# Patient Record
Sex: FEMALE | Race: WHITE | NOT HISPANIC OR LATINO | Employment: PART TIME
[De-identification: names, ages, dates, MRNs, and addresses within clinical notes are randomized per-mention and may not be internally consistent; named-entity substitution may affect disease eponyms.]

---

## 2019-12-15 ENCOUNTER — HEALTH MAINTENANCE LETTER (OUTPATIENT)
Age: 34
End: 2019-12-15

## 2020-03-22 ENCOUNTER — HEALTH MAINTENANCE LETTER (OUTPATIENT)
Age: 35
End: 2020-03-22

## 2021-01-15 ENCOUNTER — HEALTH MAINTENANCE LETTER (OUTPATIENT)
Age: 36
End: 2021-01-15

## 2021-10-24 ENCOUNTER — HEALTH MAINTENANCE LETTER (OUTPATIENT)
Age: 36
End: 2021-10-24

## 2022-02-13 ENCOUNTER — HEALTH MAINTENANCE LETTER (OUTPATIENT)
Age: 37
End: 2022-02-13

## 2022-10-16 ENCOUNTER — HEALTH MAINTENANCE LETTER (OUTPATIENT)
Age: 37
End: 2022-10-16

## 2023-03-26 ENCOUNTER — HEALTH MAINTENANCE LETTER (OUTPATIENT)
Age: 38
End: 2023-03-26

## 2023-04-02 ENCOUNTER — HOSPITAL ENCOUNTER (OUTPATIENT)
Facility: CLINIC | Age: 38
Setting detail: OBSERVATION
Discharge: HOME | End: 2023-04-04
Attending: HOSPITALIST | Admitting: HOSPITALIST
Payer: COMMERCIAL

## 2023-04-02 DIAGNOSIS — I26.99 PULMONARY EMBOLISM, UNSPECIFIED CHRONICITY, UNSPECIFIED PULMONARY EMBOLISM TYPE, UNSPECIFIED WHETHER ACUTE COR PULMONALE PRESENT (H): ICD-10-CM

## 2023-04-02 DIAGNOSIS — K85.10 GALLSTONE PANCREATITIS: Primary | ICD-10-CM

## 2023-04-02 LAB
ALBUMIN SERPL BCG-MCNC: 4.6 G/DL (ref 3.5–5.2)
ALP SERPL-CCNC: 76 U/L (ref 35–104)
ALT SERPL W P-5'-P-CCNC: 281 U/L (ref 10–35)
ANION GAP SERPL CALCULATED.3IONS-SCNC: 11 MMOL/L (ref 7–15)
AST SERPL W P-5'-P-CCNC: 159 U/L (ref 10–35)
BASOPHILS # BLD MANUAL: 0 10E3/UL (ref 0–0.2)
BASOPHILS NFR BLD MANUAL: 0 %
BILIRUB SERPL-MCNC: 0.3 MG/DL
BUN SERPL-MCNC: 6.1 MG/DL (ref 6–20)
CALCIUM SERPL-MCNC: 9.3 MG/DL (ref 8.6–10)
CHLORIDE SERPL-SCNC: 107 MMOL/L (ref 98–107)
CREAT SERPL-MCNC: 0.6 MG/DL (ref 0.51–0.95)
DEPRECATED HCO3 PLAS-SCNC: 25 MMOL/L (ref 22–29)
EOSINOPHIL # BLD MANUAL: 0.1 10E3/UL (ref 0–0.7)
EOSINOPHIL NFR BLD MANUAL: 1 %
ERYTHROCYTE [DISTWIDTH] IN BLOOD BY AUTOMATED COUNT: 12.1 % (ref 10–15)
GFR SERPL CREATININE-BSD FRML MDRD: >90 ML/MIN/1.73M2
GLUCOSE SERPL-MCNC: 84 MG/DL (ref 70–99)
HCG INTACT+B SERPL-ACNC: <1 MIU/ML
HCT VFR BLD AUTO: 40.7 % (ref 35–47)
HGB BLD-MCNC: 13.8 G/DL (ref 11.7–15.7)
LIPASE SERPL-CCNC: 85 U/L (ref 13–60)
LYMPHOCYTES # BLD MANUAL: 1.4 10E3/UL (ref 0.8–5.3)
LYMPHOCYTES NFR BLD MANUAL: 23 %
MCH RBC QN AUTO: 30.6 PG (ref 26.5–33)
MCHC RBC AUTO-ENTMCNC: 33.9 G/DL (ref 31.5–36.5)
MCV RBC AUTO: 90 FL (ref 78–100)
MONOCYTES # BLD MANUAL: 0.5 10E3/UL (ref 0–1.3)
MONOCYTES NFR BLD MANUAL: 8 %
NEUTROPHILS # BLD MANUAL: 4.3 10E3/UL (ref 1.6–8.3)
NEUTROPHILS NFR BLD MANUAL: 68 %
PLAT MORPH BLD: ABNORMAL
PLATELET # BLD AUTO: 212 10E3/UL (ref 150–450)
POTASSIUM SERPL-SCNC: 4 MMOL/L (ref 3.4–5.3)
PROT SERPL-MCNC: 7.3 G/DL (ref 6.4–8.3)
RBC # BLD AUTO: 4.51 10E6/UL (ref 3.8–5.2)
RBC MORPH BLD: ABNORMAL
SODIUM SERPL-SCNC: 143 MMOL/L (ref 136–145)
VARIANT LYMPHS BLD QL SMEAR: PRESENT
WBC # BLD AUTO: 6.3 10E3/UL (ref 4–11)

## 2023-04-02 PROCEDURE — 85027 COMPLETE CBC AUTOMATED: CPT | Performed by: NURSE PRACTITIONER

## 2023-04-02 PROCEDURE — 99221 1ST HOSP IP/OBS SF/LOW 40: CPT | Mod: 57 | Performed by: SURGERY

## 2023-04-02 PROCEDURE — 36415 COLL VENOUS BLD VENIPUNCTURE: CPT | Performed by: NURSE PRACTITIONER

## 2023-04-02 PROCEDURE — 84702 CHORIONIC GONADOTROPIN TEST: CPT | Performed by: NURSE PRACTITIONER

## 2023-04-02 PROCEDURE — 99207 PR APP CREDIT; MD BILLING SHARED VISIT: CPT | Mod: FS | Performed by: INTERNAL MEDICINE

## 2023-04-02 PROCEDURE — 80053 COMPREHEN METABOLIC PANEL: CPT | Performed by: NURSE PRACTITIONER

## 2023-04-02 PROCEDURE — 258N000003 HC RX IP 258 OP 636: Performed by: NURSE PRACTITIONER

## 2023-04-02 PROCEDURE — 85007 BL SMEAR W/DIFF WBC COUNT: CPT | Performed by: NURSE PRACTITIONER

## 2023-04-02 PROCEDURE — 120N000001 HC R&B MED SURG/OB

## 2023-04-02 PROCEDURE — 99222 1ST HOSP IP/OBS MODERATE 55: CPT | Mod: AI | Performed by: NURSE PRACTITIONER

## 2023-04-02 PROCEDURE — 83690 ASSAY OF LIPASE: CPT | Performed by: NURSE PRACTITIONER

## 2023-04-02 RX ORDER — NALOXONE HYDROCHLORIDE 0.4 MG/ML
0.4 INJECTION, SOLUTION INTRAMUSCULAR; INTRAVENOUS; SUBCUTANEOUS
Status: DISCONTINUED | OUTPATIENT
Start: 2023-04-02 | End: 2023-04-04 | Stop reason: HOSPADM

## 2023-04-02 RX ORDER — POLYETHYLENE GLYCOL 3350 17 G/17G
17 POWDER, FOR SOLUTION ORAL DAILY PRN
Status: DISCONTINUED | OUTPATIENT
Start: 2023-04-02 | End: 2023-04-04 | Stop reason: HOSPADM

## 2023-04-02 RX ORDER — NALOXONE HYDROCHLORIDE 0.4 MG/ML
0.2 INJECTION, SOLUTION INTRAMUSCULAR; INTRAVENOUS; SUBCUTANEOUS
Status: DISCONTINUED | OUTPATIENT
Start: 2023-04-02 | End: 2023-04-04 | Stop reason: HOSPADM

## 2023-04-02 RX ORDER — ONDANSETRON 2 MG/ML
4 INJECTION INTRAMUSCULAR; INTRAVENOUS EVERY 6 HOURS PRN
Status: DISCONTINUED | OUTPATIENT
Start: 2023-04-02 | End: 2023-04-04 | Stop reason: HOSPADM

## 2023-04-02 RX ORDER — LIDOCAINE 40 MG/G
CREAM TOPICAL
Status: DISCONTINUED | OUTPATIENT
Start: 2023-04-02 | End: 2023-04-04 | Stop reason: HOSPADM

## 2023-04-02 RX ORDER — SODIUM CHLORIDE 9 MG/ML
INJECTION, SOLUTION INTRAVENOUS CONTINUOUS
Status: DISCONTINUED | OUTPATIENT
Start: 2023-04-02 | End: 2023-04-03

## 2023-04-02 RX ORDER — AMOXICILLIN 250 MG
1 CAPSULE ORAL 2 TIMES DAILY PRN
Status: DISCONTINUED | OUTPATIENT
Start: 2023-04-02 | End: 2023-04-04 | Stop reason: HOSPADM

## 2023-04-02 RX ORDER — ONDANSETRON 4 MG/1
4 TABLET, ORALLY DISINTEGRATING ORAL EVERY 6 HOURS PRN
Status: DISCONTINUED | OUTPATIENT
Start: 2023-04-02 | End: 2023-04-04 | Stop reason: HOSPADM

## 2023-04-02 RX ORDER — HYDROMORPHONE HYDROCHLORIDE 1 MG/ML
0.3 INJECTION, SOLUTION INTRAMUSCULAR; INTRAVENOUS; SUBCUTANEOUS
Status: DISCONTINUED | OUTPATIENT
Start: 2023-04-02 | End: 2023-04-03

## 2023-04-02 RX ORDER — BISACODYL 10 MG
10 SUPPOSITORY, RECTAL RECTAL DAILY PRN
Status: DISCONTINUED | OUTPATIENT
Start: 2023-04-02 | End: 2023-04-04 | Stop reason: HOSPADM

## 2023-04-02 RX ADMIN — SODIUM CHLORIDE: 9 INJECTION, SOLUTION INTRAVENOUS at 23:20

## 2023-04-02 RX ADMIN — SODIUM CHLORIDE: 9 INJECTION, SOLUTION INTRAVENOUS at 13:31

## 2023-04-02 ASSESSMENT — ACTIVITIES OF DAILY LIVING (ADL)
ADLS_ACUITY_SCORE: 37

## 2023-04-02 NOTE — PROGRESS NOTES
Reason for admission: Acute cholecystitis, possible gallstone pancreatitis  Surgical Procedure/s: Plan cholecystectomy with Intra-Op cholangiogram tomorrow.  Mental Status:x4  Activity: Independent  Diet: Regular, NPO after midnight  Pain: controlled and tolerable.  Urination: Continent b/b. Uses the bathroom  Tele/Restraints/Iso: NSR  LDA: PIV infusing w/ NS at 100 ml/hr  Expected D/C Date: TBD  Other Info: Per hospitalist, will hold off on antibiotics No fever or elevated WBCs. GI and General Surgery ff.

## 2023-04-02 NOTE — PHARMACY-ADMISSION MEDICATION HISTORY
Pharmacy Medication History  Admission medication history interview status for the 4/2/2023  admission is complete. See EPIC admission navigator for prior to admission medications     Location of Interview: Patient room  Medication history sources: Patient, Surescripts and Care Everywhere    Significant changes made to the medication list:  No changes made to the list    In the past week, patient estimated taking medication this percent of the time: greater than 90%      Additional medication history information:   Patient stated that she takes no prescription or over the counter medications.     Medication reconciliation completed by provider prior to medication history? Yes    Time spent in this activity: 10 minutes    Prior to Admission medications    Not on File       The information provided in this note is only as accurate as the sources available at the time of update(s)

## 2023-04-02 NOTE — CONSULTS
37-year-old female with acute biliary colic and gallbladder wall thickening elevated LFTs and minimally elevated lipase ultrasound findings consistent with normal CBD no other obvious significant pancreas findings.  Agree with the current plan n.p.o. further evaluation in general surgery for possible cholecystitis.  Repeat labs again tomorrow.  Cholecystectomy with Intra-Op cholangiogram.  IV fluid, IV Protonix.  IV pain control.    Julio Cesar Colon MD FACP

## 2023-04-02 NOTE — H&P
[facility]    History and Physical - Hospitalist Service       Date of Admission:  4/2/2023    Assessment & Plan      Jaimee Strauss is a 37 year old female admitted on 4/2/2023 with past medical history of PE and mild intermittent asthma who presents to [facility] with complaint of upper right quadrant pain. Patient transferred to [facility] for higher level of care with general surgeon.       Labs at [facility]  white count is normal, alk phos and bili are normal.  ,  and lipase 633  Gallbladder ultrasound: cholelithasis with borderline gallbladder thickening, which could be related to cholecystitis       Labs:  Lipase 85          Acute gallstone pancreatitis   Mild Cholecystitis   -consult GI   -consult general surgery   -NPO   -IVF  -IV Protonix   -pain control   -antiemetic as needed  -will hold off on antibiotics No fever or elevated WBCs     History of PE 2015  -related to OCP's she was told and hospitalized 4 days back in January of 2015    history mild intermittent asthma   -no active issue  -patient denies taking any medications        Diet: NPO for Medical/Clinical Reasons Except for: No Exceptions    DVT Prophylaxis: Pneumatic Compression Devices and ambulate   Salomon Catheter: Not present  Lines: None     Cardiac Monitoring: ACTIVE order. Indication: gallstone pancreatisis  Code Status: Full Code      Clinically Significant Risk Factors Present on Admission                               Disposition Plan      Expected Discharge Date: 04/04/2023                The patient's care was discussed with the Attending Physician, Dr. Eubanks .    Deepa Pierce, CNP  Hospitalist Service  [facility]  Securely message with [employer] (more info)  Text page via [employer] Paging/Directory     ______________________________________________________________________    Chief Complaint   Right upper quadrant pain     History is obtained  from the patient, electronic health record and patient's significant other    History of Present Illness   Jaimee Strauss is a 37 year old female who with past medical history of PE and mild intermittent asthma  who presents to [facility] with complaint of upper right quadrant pain. Patient transferred to [facility] for higher level of care with general surgeon. Patient had been experiencing epigastric and right upper quadrant pain for 3 days. She reports right mid back pain association with this. She was unable to sleep last night due to the discomfort. She states she feel like her abdomen is swollen and pressure in the inside. She also reports few days ago sh had left sided ovary pain which is unusual for her. She denies vomiting or diarrhea but have had some nausea. She has had prior abdomen surgeries before including 3  and hysterectomy. She does have her gallbladder.         Past Medical History    Past Medical History:   Diagnosis Date     Asthma      NO ACTIVE PROBLEMS      PE (pulmonary embolism) 13    on coumadin       Past Surgical History   Past Surgical History:   Procedure Laterality Date      SECTION      X2     NO HISTORY OF SURGERY         Prior to Admission Medications   None        Physical Exam   Vital Signs: Temp: 98  F (36.7  C) Temp src: Oral BP: 119/76 Pulse: 84   Resp: 16 SpO2: 100 % O2 Device: None (Room air)    Weight: 0 lbs 0 oz   Constitutional: no distress, cooperative, alert, oriented, normal mood  Cardiovascular: Regular rate and rhythm, no murmurs  Respiratory: clear bilaterally, no crackles, wheezing or rales  Gastrointestinal: Abdomen soft, no significant distention, tender to palpation of RUQ . Abdomen is not rigid and she is not guarding   Skin: warm, dry, intact, no edema    Medical Decision Making       70 MINUTES SPENT BY ME on the date of service doing chart review, history, exam, documentation & further activities per the note.  MANAGEMENT  DISCUSSED with the following over the past 24 hours: patient, patient , and Dr. Eubanks        Data     I have personally reviewed the following data over the past 24 hrs:    6.3  \   13.8   / 212     143 107 6.1 /  84   4.0 25 0.60 \       ALT: 281 (H) AST: 159 (H) AP: 76 TBILI: 0.3   ALB: 4.6 TOT PROTEIN: 7.3 LIPASE: 85 (H)       Imaging results reviewed over the past 24 hrs:   No results found for this or any previous visit (from the past 24 hour(s)).

## 2023-04-02 NOTE — CONSULTS
Surgery Consultation, [facility]         Kalpesh Lou MD, MD    Jaimee Strauss MRN# [id number]   YOB: 1985 Age: 37 year old     PCP:  Chantell Thompson [phone]    Chief Complaint:  Right upper quadrant pain, nausea    History of Present Illness:  Jaimee Strauss is a 37 year old female who presented with several episodes of upper abdominal pain and intermittent nausea, usually after eating.  Commonly associated with eating greasy foods.  Her first episode occurred approximately 3 days ago when she was on vacation in [place].  Pain lasted a couple of hours but improved.  Most recent episode occurred last night when she was awoken from sleep.  Pain was very severe, substernal, radiating toward the right.  Patient had some nausea but no emesis.  She presented to an outside clinic where she was found to have gallstones with some mild elevation in her liver enzymes and an elevation in her lipase.  She was transferred for definitive care.  Currently feels significantly better.      PMH:  Jaimee Strauss  has a past medical history of Asthma, NO ACTIVE PROBLEMS, and PE (pulmonary embolism) (13).  PSH:  Jaimee Strauss  has a past surgical history that includes no history of surgery and  section.    Home medications and allergies reviewed.    Social History:  Jaimee Strauss  reports that she has never smoked. She has never used smokeless tobacco. She reports that she does not drink alcohol and does not use drugs.  Family History:  Jaimee Strauss family history includes Cancer in her maternal grandfather; Cardiovascular in her maternal grandmother; Hypertension in her maternal grandmother.    ROS:  The 10 point Review of Systems is negative other than noted in the HPI.  No fever or chills.  Does admit to a significant weight loss, intentional, over the past few months.    Physical Exam:  Blood pressure 119/76, pulse 84, temperature 98  F (36.7   C), temperature source Oral, resp. rate 16, SpO2 100 %, not currently breastfeeding.  0 lbs 0 oz  Healthy appearing young female in no distress.  Patient has a pleasant affect, speaks without difficulty.   Pupils equal round and reactive to light.   No cervical lymphadenopathy or thyromegaly.   Lung fields clear, breathing comfortably.   Heart normal sinus rhythm.  No murmurs rubs or gallops.  Abdomen soft, nontender, nondistended.  Minimal tenderness in the right upper quadrant, no masses appreciated. No peritoneal signs or rebound.  Admits to significantly less pain with palpation than 6 hours ago  Skin warm, dry, without rashes or lesions.    All new lab and imaging data was reviewed.  Abdominal ultrasound shows gallbladder with questionable thickening and gallstones     Assessment/plan:  Jaimee Strauss is a 37 year old female with signs and symptoms suggesting acute cholecystitis, possible gallstone pancreatitis. I've recommended laparoscopic cholecystectomy with Intra-Op cholangiogram. Surgical comorbidities include history of pulmonary embolism.  I feel the patient is a good candidate for the surgery and that this should be done during this hospitalization.  If the cholangiogram reveals choledocholithiasis, ERCP would likely be necessary.  We will check her labs tomorrow morning and get her scheduled for surgery.  Okay to eat tonight.    Jude Lou M.D.  [facility]  [phone]    Please route or send letter to:  Primary Care Provider (PCP) and Referring Provider

## 2023-04-03 ENCOUNTER — ANESTHESIA EVENT (OUTPATIENT)
Dept: SURGERY | Facility: CLINIC | Age: 38
End: 2023-04-03
Payer: COMMERCIAL

## 2023-04-03 ENCOUNTER — APPOINTMENT (OUTPATIENT)
Dept: GENERAL RADIOLOGY | Facility: CLINIC | Age: 38
End: 2023-04-03
Attending: HOSPITALIST
Payer: COMMERCIAL

## 2023-04-03 ENCOUNTER — ANESTHESIA (OUTPATIENT)
Dept: SURGERY | Facility: CLINIC | Age: 38
End: 2023-04-03
Payer: COMMERCIAL

## 2023-04-03 LAB
ALBUMIN SERPL BCG-MCNC: 4.1 G/DL (ref 3.5–5.2)
ALP SERPL-CCNC: 62 U/L (ref 35–104)
ALT SERPL W P-5'-P-CCNC: 182 U/L (ref 10–35)
ANION GAP SERPL CALCULATED.3IONS-SCNC: 8 MMOL/L (ref 7–15)
AST SERPL W P-5'-P-CCNC: 61 U/L (ref 10–35)
BILIRUB DIRECT SERPL-MCNC: <0.2 MG/DL (ref 0–0.3)
BILIRUB SERPL-MCNC: 0.4 MG/DL
BUN SERPL-MCNC: 5.8 MG/DL (ref 6–20)
CALCIUM SERPL-MCNC: 8.7 MG/DL (ref 8.6–10)
CHLORIDE SERPL-SCNC: 109 MMOL/L (ref 98–107)
CREAT SERPL-MCNC: 0.59 MG/DL (ref 0.51–0.95)
DEPRECATED HCO3 PLAS-SCNC: 24 MMOL/L (ref 22–29)
ERYTHROCYTE [DISTWIDTH] IN BLOOD BY AUTOMATED COUNT: 12.2 % (ref 10–15)
GFR SERPL CREATININE-BSD FRML MDRD: >90 ML/MIN/1.73M2
GLUCOSE SERPL-MCNC: 87 MG/DL (ref 70–99)
HCT VFR BLD AUTO: 39.1 % (ref 35–47)
HGB BLD-MCNC: 12.9 G/DL (ref 11.7–15.7)
MCH RBC QN AUTO: 30.4 PG (ref 26.5–33)
MCHC RBC AUTO-ENTMCNC: 33 G/DL (ref 31.5–36.5)
MCV RBC AUTO: 92 FL (ref 78–100)
PLATELET # BLD AUTO: 192 10E3/UL (ref 150–450)
POTASSIUM SERPL-SCNC: 4.2 MMOL/L (ref 3.4–5.3)
PROT SERPL-MCNC: 6.5 G/DL (ref 6.4–8.3)
RBC # BLD AUTO: 4.25 10E6/UL (ref 3.8–5.2)
SODIUM SERPL-SCNC: 141 MMOL/L (ref 136–145)
WBC # BLD AUTO: 5.3 10E3/UL (ref 4–11)

## 2023-04-03 PROCEDURE — 250N000025 HC SEVOFLURANE, PER MIN: Performed by: SURGERY

## 2023-04-03 PROCEDURE — 258N000003 HC RX IP 258 OP 636: Performed by: ANESTHESIOLOGY

## 2023-04-03 PROCEDURE — 82040 ASSAY OF SERUM ALBUMIN: CPT | Performed by: NURSE PRACTITIONER

## 2023-04-03 PROCEDURE — 999N000141 HC STATISTIC PRE-PROCEDURE NURSING ASSESSMENT: Performed by: SURGERY

## 2023-04-03 PROCEDURE — 258N000003 HC RX IP 258 OP 636: Performed by: NURSE ANESTHETIST, CERTIFIED REGISTERED

## 2023-04-03 PROCEDURE — 250N000009 HC RX 250: Performed by: NURSE ANESTHETIST, CERTIFIED REGISTERED

## 2023-04-03 PROCEDURE — 96375 TX/PRO/DX INJ NEW DRUG ADDON: CPT

## 2023-04-03 PROCEDURE — 85027 COMPLETE CBC AUTOMATED: CPT | Performed by: NURSE PRACTITIONER

## 2023-04-03 PROCEDURE — 272N000001 HC OR GENERAL SUPPLY STERILE: Performed by: SURGERY

## 2023-04-03 PROCEDURE — 370N000017 HC ANESTHESIA TECHNICAL FEE, PER MIN: Performed by: SURGERY

## 2023-04-03 PROCEDURE — 250N000011 HC RX IP 250 OP 636: Performed by: SURGERY

## 2023-04-03 PROCEDURE — 96374 THER/PROPH/DIAG INJ IV PUSH: CPT | Mod: 59

## 2023-04-03 PROCEDURE — 999N000179 XR SURGERY CARM FLUORO LESS THAN 5 MIN W STILLS

## 2023-04-03 PROCEDURE — 250N000011 HC RX IP 250 OP 636: Performed by: ANESTHESIOLOGY

## 2023-04-03 PROCEDURE — 88304 TISSUE EXAM BY PATHOLOGIST: CPT | Mod: TC | Performed by: SURGERY

## 2023-04-03 PROCEDURE — 47563 LAPARO CHOLECYSTECTOMY/GRAPH: CPT | Performed by: SURGERY

## 2023-04-03 PROCEDURE — 360N000077 HC SURGERY LEVEL 4, PER MIN: Performed by: SURGERY

## 2023-04-03 PROCEDURE — 250N000009 HC RX 250: Performed by: SURGERY

## 2023-04-03 PROCEDURE — 250N000011 HC RX IP 250 OP 636: Performed by: STUDENT IN AN ORGANIZED HEALTH CARE EDUCATION/TRAINING PROGRAM

## 2023-04-03 PROCEDURE — 710N000009 HC RECOVERY PHASE 1, LEVEL 1, PER MIN: Performed by: SURGERY

## 2023-04-03 PROCEDURE — 82310 ASSAY OF CALCIUM: CPT | Performed by: NURSE PRACTITIONER

## 2023-04-03 PROCEDURE — 36415 COLL VENOUS BLD VENIPUNCTURE: CPT | Performed by: NURSE PRACTITIONER

## 2023-04-03 PROCEDURE — 99221 1ST HOSP IP/OBS SF/LOW 40: CPT | Mod: 57 | Performed by: SURGERY

## 2023-04-03 PROCEDURE — 96376 TX/PRO/DX INJ SAME DRUG ADON: CPT

## 2023-04-03 PROCEDURE — 250N000011 HC RX IP 250 OP 636: Performed by: NURSE ANESTHETIST, CERTIFIED REGISTERED

## 2023-04-03 PROCEDURE — 250N000009 HC RX 250: Performed by: ANESTHESIOLOGY

## 2023-04-03 PROCEDURE — 88304 TISSUE EXAM BY PATHOLOGIST: CPT | Mod: 26

## 2023-04-03 PROCEDURE — 250N000013 HC RX MED GY IP 250 OP 250 PS 637: Performed by: SURGERY

## 2023-04-03 PROCEDURE — 82374 ASSAY BLOOD CARBON DIOXIDE: CPT | Performed by: NURSE PRACTITIONER

## 2023-04-03 PROCEDURE — 258N000003 HC RX IP 258 OP 636: Performed by: SURGERY

## 2023-04-03 PROCEDURE — G0378 HOSPITAL OBSERVATION PER HR: HCPCS

## 2023-04-03 RX ORDER — SODIUM CHLORIDE, SODIUM LACTATE, POTASSIUM CHLORIDE, CALCIUM CHLORIDE 600; 310; 30; 20 MG/100ML; MG/100ML; MG/100ML; MG/100ML
INJECTION, SOLUTION INTRAVENOUS CONTINUOUS
Status: DISCONTINUED | OUTPATIENT
Start: 2023-04-03 | End: 2023-04-03 | Stop reason: HOSPADM

## 2023-04-03 RX ORDER — NEOSTIGMINE METHYLSULFATE 1 MG/ML
VIAL (ML) INJECTION PRN
Status: DISCONTINUED | OUTPATIENT
Start: 2023-04-03 | End: 2023-04-03

## 2023-04-03 RX ORDER — LIDOCAINE 40 MG/G
CREAM TOPICAL
Status: DISCONTINUED | OUTPATIENT
Start: 2023-04-03 | End: 2023-04-03 | Stop reason: HOSPADM

## 2023-04-03 RX ORDER — ONDANSETRON 2 MG/ML
4 INJECTION INTRAMUSCULAR; INTRAVENOUS EVERY 30 MIN PRN
Status: DISCONTINUED | OUTPATIENT
Start: 2023-04-03 | End: 2023-04-03 | Stop reason: HOSPADM

## 2023-04-03 RX ORDER — ACETAMINOPHEN 325 MG/1
650 TABLET ORAL EVERY 4 HOURS PRN
Status: DISCONTINUED | OUTPATIENT
Start: 2023-04-06 | End: 2023-04-04 | Stop reason: HOSPADM

## 2023-04-03 RX ORDER — AMOXICILLIN 250 MG
1 CAPSULE ORAL 2 TIMES DAILY
Status: DISCONTINUED | OUTPATIENT
Start: 2023-04-03 | End: 2023-04-04 | Stop reason: HOSPADM

## 2023-04-03 RX ORDER — POLYETHYLENE GLYCOL 3350 17 G/17G
17 POWDER, FOR SOLUTION ORAL DAILY
Status: DISCONTINUED | OUTPATIENT
Start: 2023-04-04 | End: 2023-04-04 | Stop reason: HOSPADM

## 2023-04-03 RX ORDER — CEFAZOLIN SODIUM/WATER 2 G/20 ML
2 SYRINGE (ML) INTRAVENOUS SEE ADMIN INSTRUCTIONS
Status: DISCONTINUED | OUTPATIENT
Start: 2023-04-03 | End: 2023-04-03 | Stop reason: HOSPADM

## 2023-04-03 RX ORDER — CEFAZOLIN SODIUM/WATER 2 G/20 ML
2 SYRINGE (ML) INTRAVENOUS
Status: COMPLETED | OUTPATIENT
Start: 2023-04-03 | End: 2023-04-03

## 2023-04-03 RX ORDER — HEPARIN SODIUM 5000 [USP'U]/.5ML
5000 INJECTION, SOLUTION INTRAVENOUS; SUBCUTANEOUS EVERY 8 HOURS SCHEDULED
Status: DISCONTINUED | OUTPATIENT
Start: 2023-04-04 | End: 2023-04-04 | Stop reason: HOSPADM

## 2023-04-03 RX ORDER — ACETAMINOPHEN 325 MG/1
975 TABLET ORAL EVERY 8 HOURS SCHEDULED
Status: DISCONTINUED | OUTPATIENT
Start: 2023-04-03 | End: 2023-04-04 | Stop reason: HOSPADM

## 2023-04-03 RX ORDER — ENOXAPARIN SODIUM 100 MG/ML
40 INJECTION SUBCUTANEOUS
Status: COMPLETED | OUTPATIENT
Start: 2023-04-03 | End: 2023-04-03

## 2023-04-03 RX ORDER — ONDANSETRON 4 MG/1
4 TABLET, ORALLY DISINTEGRATING ORAL EVERY 30 MIN PRN
Status: DISCONTINUED | OUTPATIENT
Start: 2023-04-03 | End: 2023-04-03 | Stop reason: HOSPADM

## 2023-04-03 RX ORDER — FENTANYL CITRATE 50 UG/ML
50 INJECTION, SOLUTION INTRAMUSCULAR; INTRAVENOUS EVERY 5 MIN PRN
Status: DISCONTINUED | OUTPATIENT
Start: 2023-04-03 | End: 2023-04-03 | Stop reason: HOSPADM

## 2023-04-03 RX ORDER — ONDANSETRON 2 MG/ML
4 INJECTION INTRAMUSCULAR; INTRAVENOUS EVERY 6 HOURS PRN
Status: DISCONTINUED | OUTPATIENT
Start: 2023-04-03 | End: 2023-04-03

## 2023-04-03 RX ORDER — SCOLOPAMINE TRANSDERMAL SYSTEM 1 MG/1
1 PATCH, EXTENDED RELEASE TRANSDERMAL ONCE
Status: COMPLETED | OUTPATIENT
Start: 2023-04-03 | End: 2023-04-04

## 2023-04-03 RX ORDER — LIDOCAINE HYDROCHLORIDE 20 MG/ML
INJECTION, SOLUTION INFILTRATION; PERINEURAL PRN
Status: DISCONTINUED | OUTPATIENT
Start: 2023-04-03 | End: 2023-04-03

## 2023-04-03 RX ORDER — OXYCODONE HYDROCHLORIDE 5 MG/1
5 TABLET ORAL EVERY 4 HOURS PRN
Status: DISCONTINUED | OUTPATIENT
Start: 2023-04-03 | End: 2023-04-04 | Stop reason: HOSPADM

## 2023-04-03 RX ORDER — HEPARIN SODIUM 5000 [USP'U]/.5ML
5000 INJECTION, SOLUTION INTRAVENOUS; SUBCUTANEOUS EVERY 12 HOURS
Status: DISCONTINUED | OUTPATIENT
Start: 2023-04-03 | End: 2023-04-03

## 2023-04-03 RX ORDER — DEXAMETHASONE SODIUM PHOSPHATE 4 MG/ML
INJECTION, SOLUTION INTRA-ARTICULAR; INTRALESIONAL; INTRAMUSCULAR; INTRAVENOUS; SOFT TISSUE PRN
Status: DISCONTINUED | OUTPATIENT
Start: 2023-04-03 | End: 2023-04-03

## 2023-04-03 RX ORDER — HYDROMORPHONE HCL IN WATER/PF 6 MG/30 ML
0.2 PATIENT CONTROLLED ANALGESIA SYRINGE INTRAVENOUS
Status: DISCONTINUED | OUTPATIENT
Start: 2023-04-03 | End: 2023-04-04 | Stop reason: HOSPADM

## 2023-04-03 RX ORDER — FENTANYL CITRATE 50 UG/ML
INJECTION, SOLUTION INTRAMUSCULAR; INTRAVENOUS PRN
Status: DISCONTINUED | OUTPATIENT
Start: 2023-04-03 | End: 2023-04-03

## 2023-04-03 RX ORDER — OXYCODONE HYDROCHLORIDE 5 MG/1
10 TABLET ORAL EVERY 4 HOURS PRN
Status: DISCONTINUED | OUTPATIENT
Start: 2023-04-03 | End: 2023-04-04 | Stop reason: HOSPADM

## 2023-04-03 RX ORDER — BUPIVACAINE HYDROCHLORIDE AND EPINEPHRINE 2.5; 5 MG/ML; UG/ML
INJECTION, SOLUTION INFILTRATION; PERINEURAL PRN
Status: DISCONTINUED | OUTPATIENT
Start: 2023-04-03 | End: 2023-04-03 | Stop reason: HOSPADM

## 2023-04-03 RX ORDER — LIDOCAINE 40 MG/G
CREAM TOPICAL
Status: DISCONTINUED | OUTPATIENT
Start: 2023-04-03 | End: 2023-04-03

## 2023-04-03 RX ORDER — GLYCOPYRROLATE 0.2 MG/ML
INJECTION, SOLUTION INTRAMUSCULAR; INTRAVENOUS PRN
Status: DISCONTINUED | OUTPATIENT
Start: 2023-04-03 | End: 2023-04-03

## 2023-04-03 RX ORDER — DIPHENHYDRAMINE HYDROCHLORIDE 50 MG/ML
25 INJECTION INTRAMUSCULAR; INTRAVENOUS EVERY 6 HOURS PRN
Status: DISCONTINUED | OUTPATIENT
Start: 2023-04-03 | End: 2023-04-04 | Stop reason: HOSPADM

## 2023-04-03 RX ORDER — ONDANSETRON 4 MG/1
4 TABLET, ORALLY DISINTEGRATING ORAL EVERY 6 HOURS PRN
Status: DISCONTINUED | OUTPATIENT
Start: 2023-04-03 | End: 2023-04-03

## 2023-04-03 RX ORDER — ONDANSETRON 2 MG/ML
INJECTION INTRAMUSCULAR; INTRAVENOUS PRN
Status: DISCONTINUED | OUTPATIENT
Start: 2023-04-03 | End: 2023-04-03

## 2023-04-03 RX ORDER — HYDROMORPHONE HCL IN WATER/PF 6 MG/30 ML
0.4 PATIENT CONTROLLED ANALGESIA SYRINGE INTRAVENOUS
Status: DISCONTINUED | OUTPATIENT
Start: 2023-04-03 | End: 2023-04-04 | Stop reason: HOSPADM

## 2023-04-03 RX ORDER — FENTANYL CITRATE 50 UG/ML
25 INJECTION, SOLUTION INTRAMUSCULAR; INTRAVENOUS EVERY 5 MIN PRN
Status: DISCONTINUED | OUTPATIENT
Start: 2023-04-03 | End: 2023-04-03 | Stop reason: HOSPADM

## 2023-04-03 RX ORDER — BISACODYL 10 MG
10 SUPPOSITORY, RECTAL RECTAL DAILY PRN
Status: DISCONTINUED | OUTPATIENT
Start: 2023-04-03 | End: 2023-04-04 | Stop reason: HOSPADM

## 2023-04-03 RX ORDER — PROPOFOL 10 MG/ML
INJECTION, EMULSION INTRAVENOUS PRN
Status: DISCONTINUED | OUTPATIENT
Start: 2023-04-03 | End: 2023-04-03

## 2023-04-03 RX ORDER — MAGNESIUM HYDROXIDE 1200 MG/15ML
LIQUID ORAL PRN
Status: DISCONTINUED | OUTPATIENT
Start: 2023-04-03 | End: 2023-04-03 | Stop reason: HOSPADM

## 2023-04-03 RX ORDER — HYDROMORPHONE HCL IN WATER/PF 6 MG/30 ML
0.2 PATIENT CONTROLLED ANALGESIA SYRINGE INTRAVENOUS EVERY 5 MIN PRN
Status: DISCONTINUED | OUTPATIENT
Start: 2023-04-03 | End: 2023-04-03 | Stop reason: HOSPADM

## 2023-04-03 RX ORDER — DIPHENHYDRAMINE HCL 25 MG
25 CAPSULE ORAL EVERY 6 HOURS PRN
Status: DISCONTINUED | OUTPATIENT
Start: 2023-04-03 | End: 2023-04-04 | Stop reason: HOSPADM

## 2023-04-03 RX ORDER — PROCHLORPERAZINE MALEATE 10 MG
10 TABLET ORAL EVERY 6 HOURS PRN
Status: DISCONTINUED | OUTPATIENT
Start: 2023-04-03 | End: 2023-04-04 | Stop reason: HOSPADM

## 2023-04-03 RX ORDER — DEXTROSE MONOHYDRATE, SODIUM CHLORIDE, AND POTASSIUM CHLORIDE 50; 1.49; 4.5 G/1000ML; G/1000ML; G/1000ML
INJECTION, SOLUTION INTRAVENOUS CONTINUOUS
Status: DISCONTINUED | OUTPATIENT
Start: 2023-04-03 | End: 2023-04-04 | Stop reason: HOSPADM

## 2023-04-03 RX ORDER — PROPOFOL 10 MG/ML
INJECTION, EMULSION INTRAVENOUS CONTINUOUS PRN
Status: DISCONTINUED | OUTPATIENT
Start: 2023-04-03 | End: 2023-04-03

## 2023-04-03 RX ORDER — IOPAMIDOL 612 MG/ML
INJECTION, SOLUTION INTRATHECAL PRN
Status: DISCONTINUED | OUTPATIENT
Start: 2023-04-03 | End: 2023-04-03 | Stop reason: HOSPADM

## 2023-04-03 RX ORDER — HYDROMORPHONE HCL IN WATER/PF 6 MG/30 ML
0.4 PATIENT CONTROLLED ANALGESIA SYRINGE INTRAVENOUS EVERY 5 MIN PRN
Status: DISCONTINUED | OUTPATIENT
Start: 2023-04-03 | End: 2023-04-03 | Stop reason: HOSPADM

## 2023-04-03 RX ADMIN — HEPARIN SODIUM 5000 UNITS: 5000 INJECTION, SOLUTION INTRAVENOUS; SUBCUTANEOUS at 09:29

## 2023-04-03 RX ADMIN — FENTANYL CITRATE 25 MCG: 50 INJECTION, SOLUTION INTRAMUSCULAR; INTRAVENOUS at 11:32

## 2023-04-03 RX ADMIN — PHENYLEPHRINE HYDROCHLORIDE 100 MCG: 10 INJECTION INTRAVENOUS at 10:07

## 2023-04-03 RX ADMIN — HYDROMORPHONE HYDROCHLORIDE 0.2 MG: 0.2 INJECTION, SOLUTION INTRAMUSCULAR; INTRAVENOUS; SUBCUTANEOUS at 18:21

## 2023-04-03 RX ADMIN — HYDROMORPHONE HYDROCHLORIDE 0.3 MG: 1 INJECTION, SOLUTION INTRAMUSCULAR; INTRAVENOUS; SUBCUTANEOUS at 13:19

## 2023-04-03 RX ADMIN — NEOSTIGMINE METHYLSULFATE 4 MG: 1 INJECTION, SOLUTION INTRAVENOUS at 10:55

## 2023-04-03 RX ADMIN — PHENYLEPHRINE HYDROCHLORIDE 100 MCG: 10 INJECTION INTRAVENOUS at 10:12

## 2023-04-03 RX ADMIN — HYDROMORPHONE HYDROCHLORIDE 0.5 MG: 1 INJECTION, SOLUTION INTRAMUSCULAR; INTRAVENOUS; SUBCUTANEOUS at 10:25

## 2023-04-03 RX ADMIN — SENNOSIDES AND DOCUSATE SODIUM 1 TABLET: 50; 8.6 TABLET ORAL at 20:47

## 2023-04-03 RX ADMIN — GLYCOPYRROLATE 0.6 MG: 0.2 INJECTION, SOLUTION INTRAMUSCULAR; INTRAVENOUS at 10:55

## 2023-04-03 RX ADMIN — LIDOCAINE HYDROCHLORIDE 80 MG: 20 INJECTION, SOLUTION INFILTRATION; PERINEURAL at 09:55

## 2023-04-03 RX ADMIN — Medication 2 G: at 09:46

## 2023-04-03 RX ADMIN — ROCURONIUM BROMIDE 30 MG: 50 INJECTION, SOLUTION INTRAVENOUS at 10:03

## 2023-04-03 RX ADMIN — PROPOFOL 75 MCG/KG/MIN: 10 INJECTION, EMULSION INTRAVENOUS at 09:57

## 2023-04-03 RX ADMIN — HYDROMORPHONE HYDROCHLORIDE 0.2 MG: 0.2 INJECTION, SOLUTION INTRAMUSCULAR; INTRAVENOUS; SUBCUTANEOUS at 12:00

## 2023-04-03 RX ADMIN — ACETAMINOPHEN 975 MG: 325 TABLET ORAL at 22:08

## 2023-04-03 RX ADMIN — ONDANSETRON 4 MG: 2 INJECTION INTRAMUSCULAR; INTRAVENOUS at 13:23

## 2023-04-03 RX ADMIN — HYDROMORPHONE HYDROCHLORIDE 0.2 MG: 0.2 INJECTION, SOLUTION INTRAMUSCULAR; INTRAVENOUS; SUBCUTANEOUS at 12:27

## 2023-04-03 RX ADMIN — DEXAMETHASONE SODIUM PHOSPHATE 4 MG: 4 INJECTION, SOLUTION INTRA-ARTICULAR; INTRALESIONAL; INTRAMUSCULAR; INTRAVENOUS; SOFT TISSUE at 10:03

## 2023-04-03 RX ADMIN — POTASSIUM CHLORIDE, DEXTROSE MONOHYDRATE AND SODIUM CHLORIDE: 150; 5; 450 INJECTION, SOLUTION INTRAVENOUS at 16:35

## 2023-04-03 RX ADMIN — FENTANYL CITRATE 25 MCG: 50 INJECTION, SOLUTION INTRAMUSCULAR; INTRAVENOUS at 11:37

## 2023-04-03 RX ADMIN — SUCCINYLCHOLINE CHLORIDE 160 MG: 20 INJECTION, SOLUTION INTRAMUSCULAR; INTRAVENOUS; PARENTERAL at 09:55

## 2023-04-03 RX ADMIN — OXYCODONE HYDROCHLORIDE 5 MG: 5 TABLET ORAL at 22:25

## 2023-04-03 RX ADMIN — OXYCODONE HYDROCHLORIDE 10 MG: 5 TABLET ORAL at 16:32

## 2023-04-03 RX ADMIN — SCOPALAMINE 1 PATCH: 1 PATCH, EXTENDED RELEASE TRANSDERMAL at 09:39

## 2023-04-03 RX ADMIN — ONDANSETRON 4 MG: 2 INJECTION INTRAMUSCULAR; INTRAVENOUS at 10:45

## 2023-04-03 RX ADMIN — FENTANYL CITRATE 100 MCG: 50 INJECTION, SOLUTION INTRAMUSCULAR; INTRAVENOUS at 09:55

## 2023-04-03 RX ADMIN — PHENYLEPHRINE HYDROCHLORIDE 100 MCG: 10 INJECTION INTRAVENOUS at 10:52

## 2023-04-03 RX ADMIN — ACETAMINOPHEN 975 MG: 325 TABLET ORAL at 16:32

## 2023-04-03 RX ADMIN — MIDAZOLAM 2 MG: 1 INJECTION INTRAMUSCULAR; INTRAVENOUS at 09:46

## 2023-04-03 RX ADMIN — PROPOFOL 200 MG: 10 INJECTION, EMULSION INTRAVENOUS at 09:55

## 2023-04-03 RX ADMIN — SODIUM CHLORIDE, POTASSIUM CHLORIDE, SODIUM LACTATE AND CALCIUM CHLORIDE: 600; 310; 30; 20 INJECTION, SOLUTION INTRAVENOUS at 10:52

## 2023-04-03 RX ADMIN — SODIUM CHLORIDE, POTASSIUM CHLORIDE, SODIUM LACTATE AND CALCIUM CHLORIDE: 600; 310; 30; 20 INJECTION, SOLUTION INTRAVENOUS at 09:09

## 2023-04-03 ASSESSMENT — ACTIVITIES OF DAILY LIVING (ADL)
ADLS_ACUITY_SCORE: 37
ADLS_ACUITY_SCORE: 38
ADLS_ACUITY_SCORE: 37
ADLS_ACUITY_SCORE: 38
ADLS_ACUITY_SCORE: 37
ADLS_ACUITY_SCORE: 38
ADLS_ACUITY_SCORE: 37

## 2023-04-03 NOTE — OP NOTE
Surgeon: Rajeev Lawton MD  1st Assistant: Daysi Knox MD.  The assistant was medically necessary for their expertise in camera management, suctioning, suturing, and retraction.  PREOPERATIVE DIAGNOSIS: acute cholecystitis.   POSTOPERATIVE DIAGNOSES: Same  PROCEDURE:   1.  Laparoscopic cholecystectomy.   2.  cholangiogam  ANESTHESIA: General.   ESTIMATED BLOOD LOSS: Less than 5 mL.   OPERATIVE PROCEDURE: After induction of general endotracheal anesthesia, [facility] abdomen was prepped and draped in the usual sterile fashion. With the Anna technique in an periumbilical location, the abdomen was entered and pneumoperitoneum was established. Three additional 5 mm trocars were placed along the right hypochondrium. The gallbladder fundus was retracted cephalad and lateral and the infundibulum was retracted caudad and lateral. The peritoneum investing the triangle of Calot was incised with electrocautery and dissected bluntly.  The critical view of a single cystic duct, single cystic artery was achieved.   The cystic duct was clipped proximally and cystic ductotomy done distally, cholangiocatheter advanced and secured in place.  Cholangiogram showed normal cystic duct, hepatic ducts and common bile duct.  The catheter was removed and the duct decompressed.  We then doubly clipped the cystic duct distally and transect it sharply, the artery was doubly clipped on the patient's side, singly clipped on the gallbladder side and transected sharply.  The gallbladder was detached from the liver with electrocautery and blunt dissection. The specimen was removed from the patient's abdomen and sent to pathology. The gallbladder fossa was inspected. Hemostasis was secured with clips, and no evidence of bile leakage noted. The abdomen was surveyed and no other pathology seen. The trocars were then removed under direct visualization without evidence of bleeding. Periumbilical port was closed with multiple interrupted 0  Vicryl suture. Skin was approximated with absorbable sutures. Steri-Strips and sterile dressing applied. No immediate complications.   RUPERT KRAUS MD

## 2023-04-03 NOTE — PLAN OF CARE
Pt arrived to floor from PACU around 1245. Drowsy but A&Ox4. IV Dilaudid given once for pain. IV Zofran given once for nausea. Lap sites x4 CDI. Ice applied. Reports stiffness to shoulders/ upper back/ pain when taking deep breaths. IS & up to edge of bed encouraged. Hot packs/ warm blanket applied to shoulders. Tolerating clears. Spouse at bedside. Stable on RA. Tele NSR. Voided in BR.

## 2023-04-03 NOTE — ANESTHESIA POSTPROCEDURE EVALUATION
Patient: Jaimee Strauss    Procedure: Procedure(s):  CHOLECYSTECTOMY, LAPAROSCOPIC, WITH CHOLANGIOGRAM       Anesthesia Type:  General    Note:  Disposition: Inpatient   Postop Pain Control: Uneventful            Sign Out: Well controlled pain   PONV: No   Neuro/Psych: Uneventful            Sign Out: Acceptable/Baseline neuro status   Airway/Respiratory: Uneventful            Sign Out: Acceptable/Baseline resp. status   CV/Hemodynamics: Uneventful            Sign Out: Acceptable CV status; No obvious hypovolemia; No obvious fluid overload   Other NRE: NONE   DID A NON-ROUTINE EVENT OCCUR?            Last vitals:  Vitals Value Taken Time   /73 04/03/23 1240   Temp 37  C (98.6  F) 04/03/23 1240   Pulse 80 04/03/23 1249   Resp 81 04/03/23 1249   SpO2 99 % 04/03/23 1249   Vitals shown include unvalidated device data.    Electronically Signed By: Vern Oreilly DO, DO  April 3, 2023  3:17 PM

## 2023-04-03 NOTE — UTILIZATION REVIEW
"    Admission Status; Secondary Review Determination     Under the authority of the Utilization Management Committee, the utilization review process indicated a secondary review on the above patient. The review outcome is based on review of the medical records, discussions with staff, and applying clinical experience noted on the date of the review.     (x) Observation Status Appropriate - This patient does not meet hospital inpatient criteria and is placed in observation status. If this patient's primary payer is Medicare and was admitted as an inpatient, Condition Code 44 should be used and patient status changed to \"observation\".     RATIONALE FOR DETERMINATION:    37 year old female admitted on 4/2/2023 with past medical history of PE and mild intermittent asthma who presents to [facility] with complaint of upper right quadrant pain. Patient transferred to [facility] for higher level of care with general surgeon.     VS stable.  No fever    Labs notable for lipase of 85, , .  WBC count normal.  Lipase was elevated at 633 on 4/2/23    Patient was seen by general surgery and underwent laparoscopic cholecystectomy today    Patient has been started on a diet and is being advanced as tolerated.  Discharge anticipated within the next 24 hours      The severity of illness, intensity of service provided, expected LOS and risk for adverse outcome make the care appropriate for further observation; however, doesn't meet criteria for hospital inpatient admission. LANDEN Narcisa Radhanakia notified of this determination via web based page today  This document was produced using voice recognition software.   The information on this document is developed by the utilization review team in order for the business office to ensure compliance. This only denotes the appropriateness of proper admission status and does not reflect the quality of care rendered.   The definitions of Inpatient Status and Observation Status used " in making the determination above are those provided in the CMS Coverage Manual, Chapter 1 and Chapter 6, section 70.4.     Sincerely,     Quintin Tsai MD  Utilization Review   Physician Advisor   [facility]

## 2023-04-03 NOTE — PROGRESS NOTES
General surgery    Met with patient and marco aancé, reviewed surgical medical history.  PE, , hysterectomy.  Discussed the rationale for laparoscopic cholecystectomy with intraoperative cholangiogram.  The risk, benefits, hopeful outcomes, possible complications were explained in detail.  She understands and would like to proceed.  Subcu heparin and antibiotics given for prophylaxis.  We will consult our medicine colleagues after surgery with regards to postoperative blood clot prevention.    Total encounter time 30 minutes, more than half spent in counseling, review of data, and coordination of care.

## 2023-04-03 NOTE — DISCHARGE INSTRUCTIONS
[facility]  Discharge Instructions: Post-Operative Laparoscopic Cholecystectomy    ACTIVITY  Expect to feel tired after your surgery.  This will gradually resolve.    Take frequent, short walks and increase your activity gradually.    Avoid strenuous physical activity or heavy lifting greater than 15-20 lbs. for 2-3 weeks.  You may climb stairs.  You may drive without restrictions when you are not using any prescription pain medication and feel comfortable in a car.  You may return to work/school when you are comfortable without any prescription pain medication.    WOUND CARE  You may remove your outer dressing or Band-Aids and shower 48 hours after the surgery.  Pat your incisions dry and leave them open to air.  Re-apply dressing (Band-Aids or gauze/tape) as needed for comfort or drainage.  You may have steri-strips (looks like white tape) on your incision.  You may peel off the steri-strips 2 weeks after your surgery if they have not peeled off on their own.   Do not soak your incisions in a tub or pool for 2 weeks.   Do not apply any lotions, creams, or ointments to your incisions.  A ridge under your incisions is normal and will gradually resolve.    DIET  Start with liquids, then gradually resume your regular diet as tolerated.  Avoid heavy, spicy, and greasy meals for 2-3 days.  Drink plenty of fluids to stay hydrated.  It is not uncommon to experience some loose stools or diarrhea after surgery.  This is your body's way of adapting to the bile which will slowly drain into your intestine.  A low fat diet may help with this.  This should improve over 1-2 months.    PAIN  Expect some tenderness and discomfort at the incision sites.  Use the prescribed pain medication at your discretion.  Expect gradual resolution of your pain over several days.  You may take acetaminophen/Tylenol instead of or in addition to your prescribed pain medication.   Do not drink alcohol or drive while you  are taking pain medications.  You may apply ice to your incisions in 20 minute intervals as needed for the next 48 hours.  After that time, consider switching to heat if you prefer.    EXPECTATIONS  Pain medications can cause constipation.  Limit use when possible.  Take an over the counter or prescribed stool softener/stimulant, such as Colace or Senna, 1-2 times a day with plenty of water.  You may take a mild over the counter laxative, such as Miralax or a suppository, as needed.  You may discontinue these medications once you are having regular bowel movements and/or are no longer taking your narcotic pain medication.    You may have shoulder or upper back discomfort due to the gas used in surgery.  This is temporary and should resolve in 48-72 hours.  Short, frequent walks may help with this.  If you are unable to urinate for 8 hours or feel as though you are not emptying your bladder adequately, we recommend you seek care at an ER or Urgent Care facility for possible catheter placement.     FOLLOW UP  Our office will contact you in approximately 2-3 weeks to check on your progress and answer any questions you may have.  If you are doing well, you will not need to return for a follow up appointment.  If any concerns are identified over the phone, we will help you make an appointment to see a provider.   If you have not received a phone call, have any questions or concerns, or would like to be seen, please call us at [phone] and ask to speak with our nurse.  We are located at [address] [place] [address].    CALL OUR OFFICE -[phone] IF YOU HAVE:   Chills or fever above 101 F.  Increased redness, warmth, or drainage at your incisions.  Significant bleeding.  Pain not relieved by your pain medication or rest.  Increasing pain after the first 48 hours.  Any other concerns or questions.                      Revised December 2021

## 2023-04-03 NOTE — ANESTHESIA PROCEDURE NOTES
Airway       Patient location during procedure: OR       Procedure Start/Stop Times: 4/3/2023 9:57 AM  Staff -        Anesthesiologist:  Vern Oreilly DO       CRNA: Latonia Jay APRN CRNA       Performed By: CRNA  Consent for Airway        Urgency: elective  Indications and Patient Condition       Indications for airway management: mi-procedural       Induction type:RSI       Mask difficulty assessment: 0 - not attempted    Final Airway Details       Final airway type: endotracheal airway       Successful airway: ETT - single  Endotracheal Airway Details        ETT size (mm): 7.0       Cuffed: yes       Successful intubation technique: direct laryngoscopy       DL Blade Type: Short 2       Grade View of Cords: 1       Adjucts: stylet       Position: Right       Measured from: gums/teeth       Secured at (cm): 22       Bite block used: None    Post intubation assessment        Placement verified by: capnometry, equal breath sounds and chest rise        Number of attempts at approach: 1       Number of other approaches attempted: 0       Secured with: silk tape       Ease of procedure: easy       Dentition: Unchanged    Medication(s) Administered   Medication Administration Time: 4/3/2023 9:57 AM

## 2023-04-03 NOTE — ANESTHESIA PREPROCEDURE EVALUATION
Anesthesia Pre-Procedure Evaluation    Patient: Jaimee Strauss   MRN: [id number] : 1985        Procedure : Procedure(s):  CHOLECYSTECTOMY, LAPAROSCOPIC, WITH CHOLANGIOGRAM          Past Medical History:   Diagnosis Date     Asthma      NO ACTIVE PROBLEMS      PE (pulmonary embolism) 13    on coumadin      Past Surgical History:   Procedure Laterality Date      SECTION      X2     NO HISTORY OF SURGERY        No Known Allergies   Social History     Tobacco Use     Smoking status: Never     Smokeless tobacco: Never   Vaping Use     Vaping status: Not on file   Substance Use Topics     Alcohol use: No      Wt Readings from Last 1 Encounters:   16 70.3 kg (155 lb)        Anesthesia Evaluation            ROS/MED HX  ENT/Pulmonary:     (+) Intermittent, asthma Treatment: Inhaler prn,      Neurologic:  - neg neurologic ROS     Cardiovascular:  - neg cardiovascular ROS     METS/Exercise Tolerance:     Hematologic: Comments: PE -      (+) History of blood clots, pt is not anticoagulated,     Musculoskeletal:  - neg musculoskeletal ROS     GI/Hepatic:    (-) GERD   Renal/Genitourinary:  - neg Renal ROS     Endo:  - neg endo ROS     Psychiatric/Substance Use:  - neg psychiatric ROS     Infectious Disease:  - neg infectious disease ROS     Malignancy:       Other:            Physical Exam    Airway        Mallampati: II   TM distance: > 3 FB   Neck ROM: full   Mouth opening: > 3 cm    Respiratory Devices and Support         Dental       (+) Minor Abnormalities - some fillings, tiny chips      Cardiovascular   cardiovascular exam normal          Pulmonary   pulmonary exam normal                OUTSIDE LABS:  CBC:   Lab Results   Component Value Date    WBC 5.3 2023    WBC 6.3 2023    HGB 12.9 2023    HGB 13.8 2023    HCT 39.1 2023    HCT 40.7 2023     2023     2023     BMP:   Lab Results   Component Value Date     2023      04/02/2023    POTASSIUM 4.2 04/03/2023    POTASSIUM 4.0 04/02/2023    CHLORIDE 109 (H) 04/03/2023    CHLORIDE 107 04/02/2023    CO2 24 04/03/2023    CO2 25 04/02/2023    BUN 5.8 (L) 04/03/2023    BUN 6.1 04/02/2023    CR 0.59 04/03/2023    CR 0.60 04/02/2023    GLC 87 04/03/2023    GLC 84 04/02/2023     COAGS:   Lab Results   Component Value Date    PTT 46 (H) 02/25/2013    INR 2.4 04/29/2013     POC:   Lab Results   Component Value Date    HCGS Positive (A) 04/06/2013     HEPATIC:   Lab Results   Component Value Date    ALBUMIN 4.6 04/02/2023    PROTTOTAL 7.3 04/02/2023     (H) 04/02/2023     (H) 04/02/2023    ALKPHOS 76 04/02/2023    BILITOTAL 0.3 04/02/2023     OTHER:   Lab Results   Component Value Date    LIN 8.7 04/03/2023    LIPASE 85 (H) 04/02/2023    TSH 2.24 07/28/2016       Anesthesia Plan    ASA Status:  2   NPO Status:  NPO Appropriate    Anesthesia Type: General.     - Airway: ETT   Induction: Intravenous, RSI, Propofol.   Maintenance: Balanced.        Consents    Anesthesia Plan(s) and associated risks, benefits, and realistic alternatives discussed. Questions answered and patient/representative(s) expressed understanding.    - Discussed:     - Discussed with:  Patient         Postoperative Care    Pain management: IV analgesics.   PONV prophylaxis: Ondansetron (or other 5HT-3), Background Propofol Infusion, Dexamethasone or Solumedrol     Comments:                Vern Oreilly, DO, DO

## 2023-04-03 NOTE — INTERVAL H&P NOTE
I have reviewed the surgical (or preoperative) H&P that is linked to this encounter, and examined the patient. There are no significant changes    Clinical Conditions Present on Arrival:  Clinically Significant Risk Factors Present on Admission

## 2023-04-03 NOTE — PLAN OF CARE
Goal Outcome Evaluation:    Acute cholecystitis, possibly gallstone pancreatitis, laparoscopic cholecystectomy with Intra-Op cholangiogram today    No acute events. A&Ox4, able to let needs be known. Reports intermittent nausea, but none during assessments. Bowel sounds active, pt reports passing flatus. NPO since midnight. NSR noted on the strip, VSS. NS infusing at 100ml/hr. Ambulating independently in room. Call light within reach.     Writer assumed care of pt from [id number].  Chiquita Patel RN on 4/3/2023 at 4:42 AM

## 2023-04-03 NOTE — CONSULTS
[facility]  Gastroenterology Consultation         Jaimee Strauss  [address] [address]  37 year old female    Admission Date/Time: 2023  Primary Care Provider: Chantell Thompson  Referring / Attending Physician:  Deepa Pierce CNP    We were asked to see the patient in consultation by Deepa Pierce CNP for evaluation of acute gallstone pancreatitis.      CC: abdominal pain    HPI:  Jaimee Strauss is a 37 year old female who has a PMHx of PE and mild intermittent asthma whom presents with complaint of upper right quadrant paint to [facility]. Patient c/o epigastric and right upper quadrant pain for 3 days and radiated to right mid back pain. She denies vomiting or diarrhea but have had some nausea. She has had prior abdomen surgeries before including 3  and hysterectomy.     Her workup included labs; revealed elevated LFTs ,  and lipase 633. Gallbladder ultrasound: cholelithasis with borderline gallbladder thickening, with no biliary dilation. Since transfered to [facility] lipase near normal at 85.    ROS: A comprehensive ten point review of systems was negative aside from those in mentioned in the HPI.      PAST MED HX:  I have reviewed this patient's medical history and updated it with pertinent information if needed.   Past Medical History:   Diagnosis Date     Asthma      NO ACTIVE PROBLEMS      PE (pulmonary embolism) 15    on coumadin       MEDICATIONS:   None       ALLERGIES: No Known Allergies    SOCIAL HISTORY:  Social History     Tobacco Use     Smoking status: Never     Smokeless tobacco: Never   Substance Use Topics     Alcohol use: No     Drug use: No       FAMILY HISTORY:  Family History   Problem Relation Age of Onset     Cardiovascular Maternal Grandmother      Hypertension Maternal Grandmother      Cancer Maternal Grandfather        PHYSICAL EXAM:   General  Alert, oriented and comfortable  Vital Signs with  Ranges  Temp: 97.1  F (36.2  C) Temp src: Temporal BP: 122/82 Pulse: 85   Resp: 16 SpO2: 100 % O2 Device: None (Room air)    I/O last 3 completed shifts:  In: 1611 [P.O.:640; I.V.:971]  Out: -     Constitutional: healthy, alert and no distress   Cardiovascular: negative, PMI normal. No lifts, heaves, or thrills. RRR. No murmurs, clicks gallops or rub  Respiratory: negative, Percussion normal. Good diaphragmatic excursion. Lungs clear  Abdomen: Abdomen soft, tender- RUQ. BS normal. No masses, organomegaly          ADDITIONAL COMMENTS:   I reviewed the patient's new clinical lab test results.   Recent Labs   Lab Test 04/03/23  0745 04/02/23  1222   WBC 5.3 6.3   HGB 12.9 13.8   MCV 92 90    212     Recent Labs   Lab Test 04/03/23  0745 04/02/23  1222   POTASSIUM 4.2 4.0   CHLORIDE 109* 107   CO2 24 25   BUN 5.8* 6.1   ANIONGAP 8 11     Recent Labs   Lab Test 04/02/23  1222   ALBUMIN 4.6   BILITOTAL 0.3   *   *   LIPASE 85*       I reviewed the patient's new imaging results.        CONSULTATION ASSESSMENT AND PLAN:    Jaimee Strauss is a 37 year old female with signs and symptoms suggesting acute cholecystitis, possible gallstone pancreatitis    Cholelithiasis with acute cholecystitis and possible gallstone pancreatitis  Abdominal pain  Elevated LFTs  LFTs remain moderately elevated  Lipase near normal  RUQ sonogram notes findings consistent with cholecystitis and no biliary dilation  Appreciate surgical consult and plans for laparoscopic cholecystectomy today (4/3) with intraoperative cholangiogram  If has evidence of choledocholithiasis on IOC- will plan ERCP tomorrow (4/4/)    -- Daily labs  -- Please contact GI if ERCP is necessary tomorrow based on intraoperative cholangiogram finding        [facility].  Office: [phone]  Cell : [phone] (Dr. Colon)  Cell: [phone] (Chayo Torres PA-C)

## 2023-04-03 NOTE — ANESTHESIA CARE TRANSFER NOTE
Patient: Jaimee Strauss    Procedure: Procedure(s):  CHOLECYSTECTOMY, LAPAROSCOPIC, WITH CHOLANGIOGRAM       Diagnosis: Gallstone pancreatitis [K85.10]  Diagnosis Additional Information: No value filed.    Anesthesia Type:   General     Note:    Oropharynx: oropharynx clear of all foreign objects  Level of Consciousness: awake  Oxygen Supplementation: face mask  Level of Supplemental Oxygen (L/min / FiO2): 6  Independent Airway: airway patency satisfactory and stable  Dentition: dentition unchanged  Vital Signs Stable: post-procedure vital signs reviewed and stable  Report to RN Given: handoff report given  Patient transferred to: PACU    Handoff Report: Identifed the Patient, Identified the Reponsible Provider, Reviewed the pertinent medical history, Discussed the surgical course, Reviewed Intra-OP anesthesia mangement and issues during anesthesia, Set expectations for post-procedure period and Allowed opportunity for questions and acknowledgement of understanding      Vitals:  Vitals Value Taken Time   /97 04/03/23 1118   Temp     Pulse 79 04/03/23 1123   Resp 19 04/03/23 1123   SpO2 99 % 04/03/23 1123   Vitals shown include unvalidated device data.    Electronically Signed By: JUSTINO Dos Santos CRNA  April 3, 2023  11:25 AM

## 2023-04-03 NOTE — PROGRESS NOTES
"Surgical team paged- \"Pt is post op lap choly and does not have any orders for pain control or nausea ex for iv dilaudid and zofran. Could we please get further orders?\"   TY    Orders received.   "

## 2023-04-03 NOTE — PROGRESS NOTES
"[facility]    Medicine Progress Note - Hospitalist Service    Date of Admission:  4/2/2023    Assessment & Plan      Jaimee Strauss is a 37 year old female admitted on 4/2/2023 with past medical history of PE and mild intermittent asthma who presents to [facility] with complaint of upper right quadrant pain. Patient transferred to [facility] for higher level of care with general surgeon.       Labs at [facility]  white count is normal, alk phos and bili are normal.  ,  and lipase 633  Gallbladder ultrasound: cholelithasis with borderline gallbladder thickening, which could be related to cholecystitis       Labs:  Lipase 85          Acute gallstone pancreatitis   Mild Cholecystitis   -consult GI   -consult general surgery   -IV Protonix   -LFTs remain moderately elevated  -Lipase near normal  -pain control   -antiemetic as needed  -pre-op and post op antibiotic  Ancef  -laparoscopic cholecystectomy today   -GI: If has evidence of choledocholithiasis on IOC- will plan ERCP tomorrow     History of PE 2015  -related to OCP's she was told and hospitalized 4 days back in January of 2015    history mild intermittent asthma   -no active issue  -patient denies taking any medications          Diet: Advance Diet as Tolerated: Clear Liquid Diet; Regular Diet Adult  Diet    DVT Prophylaxis: heparin subcutaneous   Salomon Catheter: Not present  Lines: None     Cardiac Monitoring: ACTIVE order. Indication: gallstone pancreatisis  Code Status: Full Code      Clinically Significant Risk Factors Present on Admission                       # Overweight: Estimated body mass index is 27.46 kg/m  as calculated from the following:    Height as of this encounter: 1.6 m (5' 3\").    Weight as of this encounter: 70.3 kg (155 lb).           Disposition Plan     Expected Discharge Date: 04/04/2023                The patient's care was discussed with the Attending Physician,  " Junaid.    Deepa Pierce CNP  Hospitalist Service  [facility]  Securely message with [facility] (more info)  Text page via [employer] Paging/Directory   ______________________________________________________________________    Interval History   -NAD noted  -laparoscopic cholecystectomy today     Physical Exam   Vital Signs: Temp: 97.1  F (36.2  C) Temp src: Temporal BP: 122/82 Pulse: 85   Resp: 16 SpO2: 100 % O2 Device: None (Room air)    Weight: 155 lbs 0 oz    Constitutional: no distress, cooperative, alert, oriented, normal mood  Cardiovascular: Regular rate and rhythm, no murmurs  Respiratory: clear bilaterally, no crackles, wheezing or rales  Gastrointestinal: Abdomen soft, no significant distention, tender to palpation of RUQ . Abdomen is not rigid and she is not guarding   Skin: warm, dry, intact, no edema    Medical Decision Making       25 MINUTES SPENT BY ME on the date of service doing chart review, history, exam, documentation & further activities per the note.  MANAGEMENT DISCUSSED with the following over the past 24 hours: patient and patient , charge nurse, and Dr. Quiroga        Data     I have personally reviewed the following data over the past 24 hrs:    5.3  \   12.9   / 192     141 109 (H) 5.8 (L) /  87   4.2 24 0.59 \       ALT: 182 (H) AST: 61 (H) AP: 62 TBILI: 0.4   ALB: 4.1 TOT PROTEIN: 6.5 LIPASE: N/A       Imaging results reviewed over the past 24 hrs:   Recent Results (from the past 24 hour(s))   XR Surgery DARIUS Fluoro Less Than 5 Min w Stills    Narrative    XR SURGERY DARIUS FLUORO LESS THAN 5 MIN W STILLS 4/3/2023 10:40 AM     COMPARISON: None    HISTORY: Intraoperative Cholangiogram    FLUOROSCOPY TIME: 0.2 minute(s)      Impression    IMPRESSION: Bile ducts are normal in caliber with contrast emptying  into the duodenum. See operative report for details.    MERCY REECE MD         SYSTEM ID:  [id number]

## 2023-04-04 VITALS
TEMPERATURE: 98.6 F | DIASTOLIC BLOOD PRESSURE: 67 MMHG | HEART RATE: 70 BPM | HEIGHT: 63 IN | OXYGEN SATURATION: 98 % | WEIGHT: 155 LBS | BODY MASS INDEX: 27.46 KG/M2 | SYSTOLIC BLOOD PRESSURE: 99 MMHG | RESPIRATION RATE: 18 BRPM

## 2023-04-04 LAB
ALBUMIN SERPL BCG-MCNC: 4 G/DL (ref 3.5–5.2)
ALP SERPL-CCNC: 53 U/L (ref 35–104)
ALT SERPL W P-5'-P-CCNC: 120 U/L (ref 10–35)
ANION GAP SERPL CALCULATED.3IONS-SCNC: 9 MMOL/L (ref 7–15)
AST SERPL W P-5'-P-CCNC: 40 U/L (ref 10–35)
BILIRUB SERPL-MCNC: 0.4 MG/DL
BUN SERPL-MCNC: 3.3 MG/DL (ref 6–20)
CALCIUM SERPL-MCNC: 8.7 MG/DL (ref 8.6–10)
CHLORIDE SERPL-SCNC: 106 MMOL/L (ref 98–107)
CREAT SERPL-MCNC: 0.62 MG/DL (ref 0.51–0.95)
DEPRECATED HCO3 PLAS-SCNC: 24 MMOL/L (ref 22–29)
GFR SERPL CREATININE-BSD FRML MDRD: >90 ML/MIN/1.73M2
GLUCOSE BLDC GLUCOMTR-MCNC: 94 MG/DL (ref 70–99)
GLUCOSE SERPL-MCNC: 96 MG/DL (ref 70–99)
POTASSIUM SERPL-SCNC: 4.1 MMOL/L (ref 3.4–5.3)
PROT SERPL-MCNC: 6.2 G/DL (ref 6.4–8.3)
SODIUM SERPL-SCNC: 139 MMOL/L (ref 136–145)

## 2023-04-04 PROCEDURE — 96375 TX/PRO/DX INJ NEW DRUG ADDON: CPT

## 2023-04-04 PROCEDURE — 250N000011 HC RX IP 250 OP 636: Performed by: STUDENT IN AN ORGANIZED HEALTH CARE EDUCATION/TRAINING PROGRAM

## 2023-04-04 PROCEDURE — 99238 HOSP IP/OBS DSCHRG MGMT 30/<: CPT | Performed by: NURSE PRACTITIONER

## 2023-04-04 PROCEDURE — 258N000003 HC RX IP 258 OP 636: Performed by: SURGERY

## 2023-04-04 PROCEDURE — 96372 THER/PROPH/DIAG INJ SC/IM: CPT | Performed by: SURGERY

## 2023-04-04 PROCEDURE — 250N000013 HC RX MED GY IP 250 OP 250 PS 637: Performed by: SURGERY

## 2023-04-04 PROCEDURE — 250N000011 HC RX IP 250 OP 636: Performed by: SURGERY

## 2023-04-04 PROCEDURE — C9113 INJ PANTOPRAZOLE SODIUM, VIA: HCPCS | Performed by: STUDENT IN AN ORGANIZED HEALTH CARE EDUCATION/TRAINING PROGRAM

## 2023-04-04 PROCEDURE — 36415 COLL VENOUS BLD VENIPUNCTURE: CPT | Performed by: NURSE PRACTITIONER

## 2023-04-04 PROCEDURE — 80053 COMPREHEN METABOLIC PANEL: CPT | Performed by: NURSE PRACTITIONER

## 2023-04-04 PROCEDURE — 82962 GLUCOSE BLOOD TEST: CPT

## 2023-04-04 PROCEDURE — G0378 HOSPITAL OBSERVATION PER HR: HCPCS

## 2023-04-04 RX ORDER — AMOXICILLIN 250 MG
1-2 CAPSULE ORAL 2 TIMES DAILY PRN
Qty: 30 TABLET | Refills: 0 | Status: SHIPPED | OUTPATIENT
Start: 2023-04-04

## 2023-04-04 RX ORDER — ACETAMINOPHEN 325 MG/1
650 TABLET ORAL EVERY 6 HOURS PRN
COMMUNITY
Start: 2023-04-04

## 2023-04-04 RX ORDER — OXYCODONE HYDROCHLORIDE 5 MG/1
5-10 TABLET ORAL EVERY 4 HOURS PRN
Qty: 12 TABLET | Refills: 0 | Status: SHIPPED | OUTPATIENT
Start: 2023-04-04

## 2023-04-04 RX ADMIN — HEPARIN SODIUM 5000 UNITS: 5000 INJECTION, SOLUTION INTRAVENOUS; SUBCUTANEOUS at 06:28

## 2023-04-04 RX ADMIN — POLYETHYLENE GLYCOL 3350 17 G: 17 POWDER, FOR SOLUTION ORAL at 09:32

## 2023-04-04 RX ADMIN — PANTOPRAZOLE SODIUM 40 MG: 40 INJECTION, POWDER, FOR SOLUTION INTRAVENOUS at 09:32

## 2023-04-04 RX ADMIN — POTASSIUM CHLORIDE, DEXTROSE MONOHYDRATE AND SODIUM CHLORIDE: 150; 5; 450 INJECTION, SOLUTION INTRAVENOUS at 03:13

## 2023-04-04 RX ADMIN — OXYCODONE HYDROCHLORIDE 10 MG: 5 TABLET ORAL at 03:09

## 2023-04-04 RX ADMIN — ACETAMINOPHEN 975 MG: 325 TABLET ORAL at 06:28

## 2023-04-04 RX ADMIN — ACETAMINOPHEN 975 MG: 325 TABLET ORAL at 12:30

## 2023-04-04 RX ADMIN — SENNOSIDES AND DOCUSATE SODIUM 1 TABLET: 50; 8.6 TABLET ORAL at 09:32

## 2023-04-04 RX ADMIN — OXYCODONE HYDROCHLORIDE 5 MG: 5 TABLET ORAL at 09:32

## 2023-04-04 ASSESSMENT — ACTIVITIES OF DAILY LIVING (ADL)
ADLS_ACUITY_SCORE: 37

## 2023-04-04 NOTE — PLAN OF CARE
POD 0 arrived to gen surg from PACU 1245pm 4/3/23  Laparoscopic cholecystectomy with intra-op cholangiogram today. Found gallstone pancreatitis.    DATE & TIME: 4/3/23  [id number]    Cognitive Concerns/ Orientation : A&Ox4   BEHAVIOR & AGGRESSION TOOL COLOR: Green  ABNL VS/O2: VSS on RA  MOBILITY: Indep uses IV pole when walking; up as zabrina.  PAIN MANAGMENT: Pain controlled with PRN oxy (last @ 1025pm), dilaudid (last @ 621pm), and scheduled tylenol. Denies N/V.  DIET: Regular, as tolerated.  BOWEL/BLADDER: Voiding adequately in bathroom; no BM this shift.   DRAIN/DEVICES: R PIV infusing 5% dextrose in 0.45% NS and KCl 20 mEq/L at 100ml/hr  SKIN: 4 lap sites; dressings CDI; minor bruising around site.  TESTS/PROCEDURES: Pending surgical patho lab result.  Discharge Barriers: Pending progress

## 2023-04-04 NOTE — PLAN OF CARE
Patient discharging home with family. AVS gone over with patient in room and prescriptions given to patient. All Questions answered.

## 2023-04-04 NOTE — PROGRESS NOTES
"General Surgery Progress Note    Admission Date: 4/2/2023  Today's Date: 4/4/2023         Assessment:      Jaimee Strauss is a 37 year old female with acute cholecystitis and mild pancreatitis s/p laparoscopic cholecystectomy with intraoperative cholangiogram POD 1.  - Cholangiogram with normal biliary anatomy, no filling defect or evidence of obstruction         Plan:   - Discharge home today. Instructions reviewed, questions answered. Follow-up with surgical team via phone or in clinic in 2-3 weeks for recheck  - Pain meds and bowel regimen as needed  - Hospitalist following for possible need for anticoagulation given history of PE, greatly appreciate input  - IV fluids saline locked  - Cholangiogram clear and LFTs trending down today, bilirubin remains normal. No need for lab recheck        Interval History:   Afebrile overnight, vitals stable on room air with one reading of low blood pressure this morning (99/67). Jaimee is doing well today. She is sore from the surgery, medication is helpful. Discussed deep breathing and IS use despite abdominal pain. She has been up moving, no dizziness or lightheadedness. No shortness of breath. Tolerating regular diet without nausea. Has a history of significant post-operative constipation.          Physical Exam:   BP 99/67 (BP Location: Left arm)   Pulse 70   Temp 98.6  F (37  C) (Oral)   Resp 18   Ht 1.6 m (5' 3\")   Wt 70.3 kg (155 lb)   SpO2 98%   BMI 27.46 kg/m    I/O last 3 completed shifts:  In: 1766.67 [I.V.:1766.67]  Out: -   General: NAD, pleasant, alert and oriented x3  Cardiovascular: regular rate and rhythm; S1 and S2 distinct without murmur   Respiratory: lungs clear to auscultation bilaterally without wheezes, rales or rhonchi   Abdomen: soft, appropriately tender around incisions, non distended, + bowel sounds  Incisions: clean, dry, and intact with steris and bandaids. No erythema or drainage  Extremities: no lower extremity edema, no calf " tenderness    LABS:  Recent Labs   Lab Test 04/03/23  0745 04/02/23  1222   WBC 5.3 6.3   HGB 12.9 13.8   MCV 92 90    212      Recent Labs   Lab Test 04/04/23  0720 04/03/23  0745 04/02/23  1222   POTASSIUM 4.1 4.2 4.0   CHLORIDE 106 109* 107   CO2 24 24 25   BUN 3.3* 5.8* 6.1   CR 0.62 0.59 0.60   ANIONGAP 9 8 11      Recent Labs   Lab Test 04/04/23  0720 04/03/23  0745 04/02/23  1222   ALBUMIN 4.0 4.1 4.6   BILITOTAL 0.4 0.4 0.3   * 182* 281*   AST 40* 61* 159*   ALKPHOS 53 62 76     -------------------------------    Alena Jang PA-C  [facility]  [phone]

## 2023-04-04 NOTE — PROGRESS NOTES
General Surgery - Brief Note    Patient seen and examined. Plan for discharge home today. All instructions reviewed with patient and spouse, questions answered.  Follow-up via phone with surgical team in 2-3 weeks.   - Anticoagulation at discharge per primary hospitalist team.  Full progress note to follow.      Alena Jang PA-C  [facility]  [phone]

## 2023-04-04 NOTE — DISCHARGE SUMMARY
[facility]  Hospitalist Discharge Summary      Date of Admission:  4/2/2023  Date of Discharge:  4/4/2023  Discharging Provider: Deepa Pierce CNP  Discharge Service: Hospitalist Service    Discharge Diagnoses   acute cholecystitis and mild pancreatitis s/p laparoscopic cholecystectomy    Follow-ups Needed After Discharge   Follow-up Appointments     Follow-up and recommended labs and tests       Our office will contact you in approximately 2-3 weeks to check on your   progress and answer any questions you may have.  If you are doing well,   you will not need to return for a follow up appointment.  If any concerns   are identified over the phone, we will help you make an appointment to see   a provider.    If you have not received a phone call, have any questions or concerns, or   would like to be seen, please call us at [phone] and ask to speak   with our nurse.  We are located at [address] [place] [address].             Unresulted Labs Ordered in the Past 30 Days of this Admission     Date and Time Order Name Status Description    4/3/2023 10:51 AM Surgical Pathology Exam In process       These results will be followed up by general surgery    Discharge Disposition   Discharged to home  Condition at discharge: Stable      Hospital Course   Jaimee Strauss is a 37 year old female admitted on 4/2/2023 with past medical history of PE and mild intermittent asthma who presents to [facility] with complaint of upper right quadrant pain. Patient transferred to [facility] for higher level of care with general surgeon.         Labs at [facility]  white count is normal, alk phos and bili are normal.  ,  and lipase 633  Gallbladder ultrasound: cholelithasis with borderline gallbladder thickening, which could be related to cholecystitis         Labs:  Lipase 85            Acute gallstone pancreatitis   Cholecystitis  S/P  Laparoscopic cholecystectomy and cholangiogram  POD #1  EBL less than 5 mL  -GI signed off   -Protonix   -LFTs remain moderately elevated  -Lipase near normal  -pain control   -antiemetic as needed  -completed  Ancef  -follow up general surgery outpatient 2 to 3 weeks        History of PE 2015  -related to OCP's she was told and hospitalized 4 days back in January of 2015  -PPx eliquis 5mg bid x 14 days      history mild intermittent asthma   -no active issue  -patient denies taking any medications        Consultations This Hospital Stay   SURGERY GENERAL IP CONSULT  GASTROENTEROLOGY IP CONSULT  HOSPITALIST IP CONSULT    Code Status   Full Code    Time Spent on this Encounter   IDeepa CNP, personally saw the patient today and spent less than or equal to 30 minutes discharging this patient.       Deepa Pierce CNP  [facility]  [address] [facility] [address]  Phone: [phone]  Fax: [phone]  ______________________________________________________________________    Physical Exam   Vital Signs: Temp: 98.6  F (37  C) Temp src: Oral BP: 99/67 Pulse: 70   Resp: 18 SpO2: 98 % O2 Device: None (Room air) Oxygen Delivery: 2 LPM  Weight: 155 lbs 0 oz        Constitutional: no distress, cooperative, alert, oriented, normal mood  Cardiovascular: Regular rate and rhythm, no murmurs  Respiratory: clear bilaterally, no crackles, wheezing or rales  Gastrointestinal:soft, appropriately tender around incisions, non distended, + bowel sounds    Skin: warm and dry     Primary Care Physician   Chantell Thompson    Discharge Orders      Follow-up and recommended labs and tests     Our office will contact you in approximately 2-3 weeks to check on your progress and answer any questions you may have.  If you are doing well, you will not need to return for a follow up appointment.  If any concerns are identified over the phone, we will help you make an appointment to see a  provider.    If you have not received a phone call, have any questions or concerns, or would like to be seen, please call us at [phone] and ask to speak with our nurse.  We are located at [address] [place] [address] [address] [place] [address].     Activity    Your activity upon discharge:    Expect to feel tired after your surgery.  This will gradually resolve.    Take frequent, short walks and increase your activity gradually.    Avoid strenuous physical activity or heavy lifting greater than 15-20 lbs. for 2-3 weeks.  You may climb stairs.  You may drive without restrictions when you are not using any prescription pain medication and feel comfortable in a car.  You may return to work/school when you are comfortable without any prescription pain medication.     When to contact your care team    CALL OUR OFFICE [phone] IF YOU HAVE:   Chills or fever above 101 F.  Increased redness, warmth, or drainage at your incisions.  Significant bleeding.  Pain not relieved by your pain medication or rest.  Increasing pain after the first 48 hours.  Any other concerns or questions.     Wound care and dressings    Instructions to care for your wound at home:     You may remove your outer dressing or Band-Aids and shower 48 hours after the surgery.  Pat your incisions dry and leave them open to air.  Re-apply dressing (Band-Aids or gauze/tape) as needed for comfort or drainage.  You may have steri-strips (looks like white tape) on your incision.  You may peel off the steri-strips 2 weeks after your surgery if they have not peeled off on their own.   Do not soak your incisions in a tub or pool for 2 weeks.   Do not apply any lotions, creams, or ointments to your incisions.  A ridge under your incisions is normal and will gradually resolve.     Diet    Follow this diet upon discharge:     Start with liquids, then gradually resume your regular diet as tolerated.  Avoid heavy, spicy, and greasy meals for 2-3 days.  Drink  plenty of fluids to stay hydrated.  It is not uncommon to experience some loose stools or diarrhea after surgery.  This is your body's way of adapting to the bile which will slowly drain into your intestine.  A low fat diet may help with this.  This should improve over 1-2 months       Significant Results and Procedures   Most Recent 3 CBC's:Recent Labs   Lab Test 04/03/23  0745 04/02/23  1222   WBC 5.3 6.3   HGB 12.9 13.8   MCV 92 90    212     Most Recent 3 BMP's:Recent Labs   Lab Test 04/04/23  0720 04/04/23  0630 04/03/23  0745 04/02/23  1222     --  141 143   POTASSIUM 4.1  --  4.2 4.0   CHLORIDE 106  --  109* 107   CO2 24  --  24 25   BUN 3.3*  --  5.8* 6.1   CR 0.62  --  0.59 0.60   ANIONGAP 9  --  8 11   LIN 8.7  --  8.7 9.3   GLC 96 94 87 84     Most Recent 2 LFT's:Recent Labs   Lab Test 04/04/23  0720 04/03/23  0745   AST 40* 61*   * 182*   ALKPHOS 53 62   BILITOTAL 0.4 0.4     Most Recent 3 INR's:No lab results found.  Most Recent INR's and Anticoagulation Dosing History:  Anticoagulation Dose History         Latest Ref Rng & Units 3/12/2013 3/26/2013 4/6/2013 4/7/2013   Recent Dosing and Labs   INR 0.86 - 1.14 2.6   2.9   2.77   2.39      2.85         4/23/2013 4/26/2013 4/29/2013   Recent Dosing and Labs   INR 1.3   2.1   2.4           Multiple values from one day are sorted in reverse-chronological order           Most Recent 3 Creatinines:Recent Labs   Lab Test 04/04/23  0720 04/03/23  0745 04/02/23  1222   CR 0.62 0.59 0.60     Most Recent 3 Hemoglobins:Recent Labs   Lab Test 04/03/23  0745 04/02/23  1222   HGB 12.9 13.8     Most Recent 3 Troponin's:No lab results found.  Most Recent 3 BNP's:No lab results found.  Most Recent D-dimer:No lab results found.  Most Recent Cholesterol Panel:No lab results found.  7-Day Micro Results     No results found for the last 168 hours.        Most Recent TSH and T4:Recent Labs   Lab Test 07/28/16  1501   TSH 2.24     Most Recent Hemoglobin  A1c:No lab results found.  Most Recent 6 glucoses:Recent Labs   Lab Test 04/04/23  0720 04/04/23  0630 04/03/23  0745 04/02/23  1222   GLC 96 94 87 84     Most Recent Urinalysis:No lab results found.  Most Recent ABG:No lab results found.  Most Recent ESR & CRP:No lab results found.  Most Recent Anemia Panel:Recent Labs   Lab Test 04/03/23  0745   WBC 5.3   HGB 12.9   HCT 39.1   MCV 92        Most Recent CPK:No lab results found.,   Results for orders placed or performed during the hospital encounter of 04/02/23   XR Surgery DARIUS Fluoro Less Than 5 Min w Stills    Narrative    XR SURGERY DARIUS FLUORO LESS THAN 5 MIN W STILLS 4/3/2023 10:40 AM     COMPARISON: None    HISTORY: Intraoperative Cholangiogram    FLUOROSCOPY TIME: 0.2 minute(s)      Impression    IMPRESSION: Bile ducts are normal in caliber with contrast emptying  into the duodenum. See operative report for details.    MERCY REECE MD         SYSTEM ID:  [id number]       Discharge Medications   Current Discharge Medication List      START taking these medications    Details   acetaminophen (TYLENOL) 325 MG tablet Take 2 tablets (650 mg) by mouth every 6 hours as needed for pain    Associated Diagnoses: Gallstone pancreatitis      oxyCODONE (ROXICODONE) 5 MG tablet Take 1-2 tablets (5-10 mg) by mouth every 4 hours as needed for moderate to severe pain  Qty: 12 tablet, Refills: 0    Associated Diagnoses: Gallstone pancreatitis      senna-docusate (SENOKOT-S/PERICOLACE) 8.6-50 MG tablet Take 1-2 tablets by mouth 2 times daily as needed for constipation  Qty: 30 tablet, Refills: 0    Associated Diagnoses: Gallstone pancreatitis           Allergies   No Known Allergies

## 2023-04-04 NOTE — PLAN OF CARE
Goal Outcome Evaluation:    POD1- laparoscopic cholecystectomy with cholangiogram     Pt A&Ox4, able to let needs be known. Trocar sites intact with minimal medial ecchymotic surroundings. Bowel sounds active, no flatus yet, denied nausea during assessment. IS encouraged. PCDs on. Ambulating independently. D5 in 0.45 NS and 20 meq K infusing at 100ml/hr. Utilizing Oxycodone and scheduled Tylenol for pain. AM blood glucose resulted 94. Vital signs stable, call light within reach.     Writer assumed care of pt from [id number].  Chiquita Patel RN on 4/4/2023 at 6:53 AM

## 2023-04-05 LAB
PATH REPORT.COMMENTS IMP SPEC: NORMAL
PATH REPORT.COMMENTS IMP SPEC: NORMAL
PATH REPORT.FINAL DX SPEC: NORMAL
PATH REPORT.GROSS SPEC: NORMAL
PATH REPORT.MICROSCOPIC SPEC OTHER STN: NORMAL
PATH REPORT.RELEVANT HX SPEC: NORMAL
PHOTO IMAGE: NORMAL

## 2023-04-12 ENCOUNTER — TELEPHONE (OUTPATIENT)
Dept: SURGERY | Facility: CLINIC | Age: 38
End: 2023-04-12
Payer: COMMERCIAL

## 2023-04-12 NOTE — TELEPHONE ENCOUNTER
Name of caller: Patient    Reason for Call:  Symptoms  Pain at incision site.    Surgeon:  Dr. Lawton    Recent Surgery:  Yes.    If yes, when & what type:  4/3/2023    CHOLECYSTECTOMY, LAPAROSCOPIC, WITH CHOLANGIOGRAM      Best phone number to reach pt at is: [phone]    Ok to leave a message with medical info? Yes.

## 2023-04-12 NOTE — TELEPHONE ENCOUNTER
Procedure:  1. Laparoscopic cholecystectomy  2. Cholangiogram    Date: 4/3/23    Surgeon: Jered    Patient calling to discuss the incision closest to her umbilicus.    She reports area is still very tender and wondering if this is normal? No issues with any of the other incision sites    No signs or symptoms of infection.    Informed her that it is not concerning at this point. That incision was the largest incision and will take longer to heal than the others.    Encouraged her to continue to monitor.  If pain increases, or signs/symptoms of infection develop she should call back. Also encouraged to call back with any further questions or concerns    She agreed and will call PRN    Daniak Albrecht RN-BSN

## 2023-04-17 ENCOUNTER — TELEPHONE (OUTPATIENT)
Dept: SURGERY | Facility: CLINIC | Age: 38
End: 2023-04-17
Payer: COMMERCIAL

## 2023-04-17 NOTE — TELEPHONE ENCOUNTER
Attempted to reach patient by phone for routine follow up after her laparoscopic cholecystectomy, unable to reach patient. Will reach out again later this week.     Kalpesh Rodriguez MD

## 2023-04-19 ENCOUNTER — TELEPHONE (OUTPATIENT)
Dept: SURGERY | Facility: CLINIC | Age: 38
End: 2023-04-19
Payer: COMMERCIAL

## 2023-04-19 NOTE — TELEPHONE ENCOUNTER
Attempted to reach patient by phone again today for routine follow up after her laparoscopic cholecystectomy, unable to reach patient. Will reach out again later this week or early next week.     Kalpesh Rodriguez MD

## 2023-04-26 ENCOUNTER — TELEPHONE (OUTPATIENT)
Dept: SURGERY | Facility: CLINIC | Age: 38
End: 2023-04-26
Payer: COMMERCIAL

## 2023-04-26 NOTE — TELEPHONE ENCOUNTER
Third attempt to reach patient for routine postop follow up. Unable to reach patient at this time. She should call or follow up in clinic with any issues as needed. Pathology reviewed and negative for malignancy, consistent with acute cholecystitis.     Kalpesh Rodriguez MD, MS 04/26/23 11:47 AM   General Surgery Resident - [id number]

## 2024-06-01 ENCOUNTER — HEALTH MAINTENANCE LETTER (OUTPATIENT)
Age: 39
End: 2024-06-01

## 2025-06-14 ENCOUNTER — HEALTH MAINTENANCE LETTER (OUTPATIENT)
Age: 40
End: 2025-06-14

## 2025-07-25 ENCOUNTER — LAB REQUISITION (OUTPATIENT)
Dept: LAB | Facility: CLINIC | Age: 40
End: 2025-07-25

## 2025-07-25 DIAGNOSIS — N95.1 MENOPAUSAL AND FEMALE CLIMACTERIC STATES: ICD-10-CM

## 2025-07-25 PROCEDURE — 82670 ASSAY OF TOTAL ESTRADIOL: CPT

## 2025-07-25 PROCEDURE — 83001 ASSAY OF GONADOTROPIN (FSH): CPT

## 2025-07-26 LAB
ESTRADIOL SERPL-MCNC: 149 PG/ML
FSH SERPL IRP2-ACNC: 16.3 MIU/ML

## (undated) DEVICE — LINEN TOWEL PACK X5 5464

## (undated) DEVICE — ENDO POUCH UNIV RETRIEVAL SYSTEM INZII 10MM CD001

## (undated) DEVICE — ENDO TROCAR FIRST ENTRY KII FIOS Z-THRD 05X100MM CTF03

## (undated) DEVICE — ESU GROUND PAD UNIVERSAL W/O CORD

## (undated) DEVICE — ESU HOLDER LAP INST DISP PURPLE LONG 330MM H-PRO-330

## (undated) DEVICE — GLOVE BIOGEL PI MICRO SZ 7.5 48575

## (undated) DEVICE — MANIFOLD NEPTUNE 4 PORT 700-20

## (undated) DEVICE — SU MONOCRYL 4-0 PS-2 18" UND Y496G

## (undated) DEVICE — PREP CHLORAPREP 26ML TINTED HI-LITE ORANGE 930815

## (undated) DEVICE — PACK LAP CHOLE SLC15LCFSD

## (undated) DEVICE — CATH CHOLANGIOGRAM 4.5FR TAUT METAL TIP 20018-M55

## (undated) DEVICE — SU VICRYL 3-0 SH 27" J316H

## (undated) DEVICE — ENDO TROCAR SLEEVE KII Z-THREADED 05X100MM CTS02

## (undated) DEVICE — SOL NACL 0.9% INJ 1000ML BAG 2B1324X

## (undated) DEVICE — SUCTION IRR STRYKERFLOW II W/TIP 250-070-520

## (undated) DEVICE — ENDO TROCAR BLUNT TIP KII BALLOON 12X100MM C0R47

## (undated) DEVICE — CLIP APPLIER ENDO 5MM M/L LIGAMAX EL5ML

## (undated) DEVICE — GLOVE BIOGEL PI MICRO INDICATOR UNDERGLOVE SZ 7.5 48975

## (undated) DEVICE — SOL WATER IRRIG 1000ML BOTTLE 2F7114

## (undated) DEVICE — TUBING IV EXTENSION SET ANESTHESIA 34" MLL 2C6227

## (undated) DEVICE — SU VICRYL 0 UR-6 27" J603H

## (undated) DEVICE — EVAC SYSTEM CLEAR FLOW SC082500

## (undated) DEVICE — ENDO SCOPE WARMER LF TM500

## (undated) RX ORDER — SCOLOPAMINE TRANSDERMAL SYSTEM 1 MG/1
PATCH, EXTENDED RELEASE TRANSDERMAL
Status: DISPENSED
Start: 2023-04-03

## (undated) RX ORDER — HYDROMORPHONE HCL IN WATER/PF 6 MG/30 ML
PATIENT CONTROLLED ANALGESIA SYRINGE INTRAVENOUS
Status: DISPENSED
Start: 2023-04-03

## (undated) RX ORDER — EPINEPHRINE 1 MG/ML
INJECTION, SOLUTION INTRAMUSCULAR; SUBCUTANEOUS
Status: DISPENSED
Start: 2023-04-03

## (undated) RX ORDER — NEOSTIGMINE METHYLSULFATE 1 MG/ML
VIAL (ML) INJECTION
Status: DISPENSED
Start: 2023-04-03

## (undated) RX ORDER — HYDROMORPHONE HYDROCHLORIDE 1 MG/ML
INJECTION, SOLUTION INTRAMUSCULAR; INTRAVENOUS; SUBCUTANEOUS
Status: DISPENSED
Start: 2023-04-03

## (undated) RX ORDER — ENOXAPARIN SODIUM 100 MG/ML
INJECTION SUBCUTANEOUS
Status: DISPENSED
Start: 2023-04-03

## (undated) RX ORDER — BUPIVACAINE HYDROCHLORIDE 2.5 MG/ML
INJECTION, SOLUTION EPIDURAL; INFILTRATION; INTRACAUDAL
Status: DISPENSED
Start: 2023-04-03

## (undated) RX ORDER — DEXAMETHASONE SODIUM PHOSPHATE 4 MG/ML
INJECTION, SOLUTION INTRA-ARTICULAR; INTRALESIONAL; INTRAMUSCULAR; INTRAVENOUS; SOFT TISSUE
Status: DISPENSED
Start: 2023-04-03

## (undated) RX ORDER — CEFAZOLIN SODIUM/WATER 2 G/20 ML
SYRINGE (ML) INTRAVENOUS
Status: DISPENSED
Start: 2023-04-03

## (undated) RX ORDER — PROPOFOL 10 MG/ML
INJECTION, EMULSION INTRAVENOUS
Status: DISPENSED
Start: 2023-04-03

## (undated) RX ORDER — HEPARIN SODIUM 5000 [USP'U]/.5ML
INJECTION, SOLUTION INTRAVENOUS; SUBCUTANEOUS
Status: DISPENSED
Start: 2023-04-03

## (undated) RX ORDER — GLYCOPYRROLATE 0.2 MG/ML
INJECTION, SOLUTION INTRAMUSCULAR; INTRAVENOUS
Status: DISPENSED
Start: 2023-04-03

## (undated) RX ORDER — FENTANYL CITRATE 50 UG/ML
INJECTION, SOLUTION INTRAMUSCULAR; INTRAVENOUS
Status: DISPENSED
Start: 2023-04-03

## (undated) RX ORDER — FENTANYL CITRATE 0.05 MG/ML
INJECTION, SOLUTION INTRAMUSCULAR; INTRAVENOUS
Status: DISPENSED
Start: 2023-04-03

## (undated) RX ORDER — ONDANSETRON 2 MG/ML
INJECTION INTRAMUSCULAR; INTRAVENOUS
Status: DISPENSED
Start: 2023-04-03